# Patient Record
(demographics unavailable — no encounter records)

---

## 2025-01-30 NOTE — HISTORY OF PRESENT ILLNESS
[Postpartum Follow Up] : postpartum follow up [Complications:___] : no complications [Delivery Date: ___] : on [unfilled] [] : delivered by vaginal delivery [Male] : Delivery History: baby boy [Wt. ___] : weighing [unfilled] [Breastfeeding] : currently nursing [S/Sx PP Depression] : no signs/symptoms of postpartum depression [Erythema] : not erythematous [Back to Normal] : is back to normal in size [Mild] : mild vaginal bleeding [Normal] : the vagina was normal [Cervix Sample Taken] : cervical sample not taken for a Pap smear [Not Done] : Examination of breasts not done [Doing Well] : is doing well [No Sign of Infection] : is showing no signs of infection [Excellent Pain Control] : has excellent pain control [None] : None [de-identified] : HPI: 25 y/o F s/p  on 2024 presenting for postpartum visit. Liveborn male. She is breastfeeding. She is feeling well and is without complaints. Her mood is stable. [FreeTextEntry1] : 25 y/o F presenting for 6 week PP visit -normal PP exam -EPDS- Not depressed -Patient cleared to resume intercourse and exercise -Patient counseled on contraception options, desires condoms -f/u for 3 months for annual